# Patient Record
Sex: MALE | Race: BLACK OR AFRICAN AMERICAN | Employment: FULL TIME | URBAN - METROPOLITAN AREA
[De-identification: names, ages, dates, MRNs, and addresses within clinical notes are randomized per-mention and may not be internally consistent; named-entity substitution may affect disease eponyms.]

---

## 2022-02-04 VITALS
RESPIRATION RATE: 16 BRPM | WEIGHT: 178.57 LBS | TEMPERATURE: 98.9 F | HEART RATE: 85 BPM | HEIGHT: 77 IN | DIASTOLIC BLOOD PRESSURE: 81 MMHG | SYSTOLIC BLOOD PRESSURE: 126 MMHG | BODY MASS INDEX: 21.08 KG/M2 | OXYGEN SATURATION: 100 %

## 2022-02-04 PROCEDURE — 96372 THER/PROPH/DIAG INJ SC/IM: CPT

## 2022-02-04 PROCEDURE — 99283 EMERGENCY DEPT VISIT LOW MDM: CPT

## 2022-02-05 ENCOUNTER — HOSPITAL ENCOUNTER (EMERGENCY)
Age: 27
Discharge: HOME | End: 2022-02-05
Attending: EMERGENCY MEDICINE
Payer: COMMERCIAL

## 2022-02-05 DIAGNOSIS — M62.838 NECK MUSCLE SPASM: Primary | ICD-10-CM

## 2022-02-05 PROCEDURE — 74011250636 HC RX REV CODE- 250/636: Performed by: PHYSICIAN ASSISTANT

## 2022-02-05 PROCEDURE — 74011250637 HC RX REV CODE- 250/637: Performed by: PHYSICIAN ASSISTANT

## 2022-02-05 PROCEDURE — 74011636637 HC RX REV CODE- 636/637: Performed by: PHYSICIAN ASSISTANT

## 2022-02-05 PROCEDURE — 74011000250 HC RX REV CODE- 250: Performed by: PHYSICIAN ASSISTANT

## 2022-02-05 RX ORDER — CYCLOBENZAPRINE HCL 10 MG
10 TABLET ORAL
Qty: 10 TABLET | Refills: 0 | Status: SHIPPED | OUTPATIENT
Start: 2022-02-05

## 2022-02-05 RX ORDER — PREDNISONE 20 MG/1
20 TABLET ORAL
Status: COMPLETED | OUTPATIENT
Start: 2022-02-05 | End: 2022-02-05

## 2022-02-05 RX ORDER — CYCLOBENZAPRINE HCL 10 MG
10 TABLET ORAL
Status: COMPLETED | OUTPATIENT
Start: 2022-02-05 | End: 2022-02-05

## 2022-02-05 RX ORDER — METHYLPREDNISOLONE 4 MG/1
TABLET ORAL
Qty: 1 DOSE PACK | Refills: 0 | Status: SHIPPED | OUTPATIENT
Start: 2022-02-05

## 2022-02-05 RX ORDER — KETOROLAC TROMETHAMINE 30 MG/ML
60 INJECTION, SOLUTION INTRAMUSCULAR; INTRAVENOUS
Status: COMPLETED | OUTPATIENT
Start: 2022-02-05 | End: 2022-02-05

## 2022-02-05 RX ORDER — LIDOCAINE 4 G/100G
1 PATCH TOPICAL
Status: DISCONTINUED | OUTPATIENT
Start: 2022-02-05 | End: 2022-02-05 | Stop reason: HOSPADM

## 2022-02-05 RX ADMIN — KETOROLAC TROMETHAMINE 60 MG: 30 INJECTION, SOLUTION INTRAMUSCULAR; INTRAVENOUS at 01:18

## 2022-02-05 RX ADMIN — PREDNISONE 20 MG: 20 TABLET ORAL at 01:18

## 2022-02-05 RX ADMIN — CYCLOBENZAPRINE 10 MG: 10 TABLET, FILM COATED ORAL at 01:18

## 2022-02-05 NOTE — ED TRIAGE NOTES
Pt arrives ambulatory through triage c/o 8/10 L neck pain that has been progressively worse since he got hit by someone's arm while playing basketball on Tuesday. Pt reports sharp, throbbing pain with movement. Pt tried aleve and icy-hot without relief.

## 2022-02-05 NOTE — ED PROVIDER NOTES
21-year-old male with no significant past medical history presents amatory for evaluation of left lateral trapezius muscle pain sustained after an injury 4 days ago. He states when he was playing basketball he was hit on the top of his shoulder between his neck and shoulder joint, not at his clavicle region. He states at the time the pain was about 6/10, the next day he woke up and felt like his neck was more stiff and pain was 7/10. He states he has had decreased range of motion laterally with his neck ever since. He denies fever, chills, nausea, vomiting, diarrhea, chest pain, shortness of breath, rash. He has a dull headache. He has tried icy hot patches with little relief. He denies numbness, tingling, weakness, ataxia, pain with moving arms or legs, difficulty swallowing or breathing. History reviewed. No pertinent past medical history. History reviewed. No pertinent surgical history. History reviewed. No pertinent family history. Social History     Socioeconomic History    Marital status: SINGLE     Spouse name: Not on file    Number of children: Not on file    Years of education: Not on file    Highest education level: Not on file   Occupational History    Not on file   Tobacco Use    Smoking status: Not on file    Smokeless tobacco: Not on file   Substance and Sexual Activity    Alcohol use: Not on file    Drug use: Not on file    Sexual activity: Not on file   Other Topics Concern    Not on file   Social History Narrative    Not on file     Social Determinants of Health     Financial Resource Strain:     Difficulty of Paying Living Expenses: Not on file   Food Insecurity:     Worried About Running Out of Food in the Last Year: Not on file    Jessica of Food in the Last Year: Not on file   Transportation Needs:     Lack of Transportation (Medical): Not on file    Lack of Transportation (Non-Medical):  Not on file   Physical Activity:     Days of Exercise per Week: Not on file    Minutes of Exercise per Session: Not on file   Stress:     Feeling of Stress : Not on file   Social Connections:     Frequency of Communication with Friends and Family: Not on file    Frequency of Social Gatherings with Friends and Family: Not on file    Attends Muslim Services: Not on file    Active Member of Clubs or Organizations: Not on file    Attends Club or Organization Meetings: Not on file    Marital Status: Not on file   Intimate Partner Violence:     Fear of Current or Ex-Partner: Not on file    Emotionally Abused: Not on file    Physically Abused: Not on file    Sexually Abused: Not on file   Housing Stability:     Unable to Pay for Housing in the Last Year: Not on file    Number of [place] in the Last Year: Not on file    Unstable Housing in the Last Year: Not on file         ALLERGIES: Patient has no known allergies. Review of Systems   Constitutional: Negative. Negative for activity change, chills, fatigue and unexpected weight change. HENT: Negative for trouble swallowing. Respiratory: Negative for cough, chest tightness, shortness of breath and wheezing. Cardiovascular: Negative. Negative for chest pain and palpitations. Gastrointestinal: Negative. Negative for abdominal pain, diarrhea, nausea and vomiting. Genitourinary: Negative. Negative for dysuria, flank pain, frequency and hematuria. Musculoskeletal: Positive for neck pain. Negative for arthralgias and back pain. Skin: Negative. Negative for color change and rash. Neurological: Negative. Negative for dizziness, numbness and headaches. All other systems reviewed and are negative. Vitals:    02/04/22 2343   BP: 126/81   Pulse: 85   Resp: 16   Temp: 98.9 °F (37.2 °C)   SpO2: 100%   Weight: 81 kg (178 lb 9.2 oz)   Height: 6' 5\" (1.956 m)            Physical Exam  Vitals and nursing note reviewed. Constitutional:       General: He is not in acute distress.      Appearance: He is well-developed. He is not toxic-appearing or diaphoretic. HENT:      Head: Normocephalic and atraumatic. Eyes:      General:         Right eye: No discharge. Left eye: No discharge. Conjunctiva/sclera: Conjunctivae normal.      Pupils: Pupils are equal, round, and reactive to light. Neck:      Trachea: No tracheal tenderness. Cardiovascular:      Rate and Rhythm: Normal rate and regular rhythm. Pulses: Normal pulses. Heart sounds: Normal heart sounds. No murmur heard. No friction rub. No gallop. Pulmonary:      Effort: Pulmonary effort is normal. No respiratory distress. Breath sounds: Normal breath sounds. No wheezing or rales. Chest:      Chest wall: No tenderness. Abdominal:      General: Bowel sounds are normal. There is no distension. Palpations: Abdomen is soft. Tenderness: There is no abdominal tenderness. There is no guarding or rebound. Musculoskeletal:         General: No tenderness. Normal range of motion. Skin:     General: Skin is warm and dry. Capillary Refill: Capillary refill takes less than 2 seconds. Findings: No abrasion, erythema or rash. Neurological:      Mental Status: He is alert and oriented to person, place, and time. Cranial Nerves: No cranial nerve deficit. Sensory: No sensory deficit. Coordination: Coordination normal.   Psychiatric:         Speech: Speech normal.         Behavior: Behavior normal.          MDM  Number of Diagnoses or Management Options  Neck muscle spasm  Diagnosis management comments:   Ddx: back spasm       Amount and/or Complexity of Data Reviewed  Review and summarize past medical records: yes    Patient Progress  Patient progress: stable         Procedures    DISCHARGE NOTE:  1:34 AM  The patient has been re-evaluated and feeling much better and are stable for discharge. All available radiology and laboratory results have been reviewed with patient and/or available family. Patient and/or family verbally conveyed their understanding and agreement of the patient's signs, symptoms, diagnosis, treatment and prognosis and additionally agree to follow-up as recommended in the discharge instructions or to return to the Emergency Department should their condition change or worsen prior to their follow-up appointment. All questions have been answered and patient and/or available family express understanding. MEDICATIONS GIVEN:  Medications   lidocaine 4 % patch 1 Patch (1 Patch TransDERmal Apply Patch 2/5/22 0118)   predniSONE (DELTASONE) tablet 20 mg (20 mg Oral Given 2/5/22 0118)   cyclobenzaprine (FLEXERIL) tablet 10 mg (10 mg Oral Given 2/5/22 0118)   ketorolac (TORADOL) injection 60 mg (60 mg IntraMUSCular Given 2/5/22 0118)       IMPRESSION:  1. Neck muscle spasm        PLAN:  Follow-up Information     Follow up With Specialties Details Why Contact Sandra Sheffield 54  Schedule an appointment as soon as possible for a visit  pcp for follow-up. [phone]    [facility] EMERGENCY DEP Emergency Medicine  If symptoms worsen [address] [facility]  [phone]        Current Discharge Medication List      START taking these medications    Details   cyclobenzaprine (FLEXERIL) 10 mg tablet Take 1 Tablet by mouth three (3) times daily as needed for Muscle Spasm(s).   Qty: 10 Tablet, Refills: 0  Start date: 2/5/2022      methylPREDNISolone (Medrol, Ethan,) 4 mg tablet Take as directed with food  Qty: 1 Dose Pack, Refills: 0  Start date: 2/5/2022

## 2023-05-14 RX ORDER — METHYLPREDNISOLONE 4 MG/1
TABLET ORAL
COMMUNITY
Start: 2022-02-05

## 2023-05-14 RX ORDER — CYCLOBENZAPRINE HCL 10 MG
10 TABLET ORAL 3 TIMES DAILY PRN
COMMUNITY
Start: 2022-02-05

## 2023-11-25 ENCOUNTER — APPOINTMENT (OUTPATIENT)
Facility: HOSPITAL | Age: 28
End: 2023-11-25

## 2023-11-25 ENCOUNTER — HOSPITAL ENCOUNTER (EMERGENCY)
Facility: HOSPITAL | Age: 28
Discharge: HOME | End: 2023-11-25
Attending: STUDENT IN AN ORGANIZED HEALTH CARE EDUCATION/TRAINING PROGRAM

## 2023-11-25 VITALS
SYSTOLIC BLOOD PRESSURE: 117 MMHG | HEART RATE: 63 BPM | OXYGEN SATURATION: 100 % | DIASTOLIC BLOOD PRESSURE: 66 MMHG | TEMPERATURE: 97.8 F | RESPIRATION RATE: 18 BRPM

## 2023-11-25 DIAGNOSIS — M79.604 RIGHT LEG PAIN: Primary | ICD-10-CM

## 2023-11-25 PROCEDURE — 99284 EMERGENCY DEPT VISIT MOD MDM: CPT

## 2023-11-25 PROCEDURE — 93971 EXTREMITY STUDY: CPT

## 2023-11-25 PROCEDURE — 6370000000 HC RX 637 (ALT 250 FOR IP): Performed by: PHYSICIAN ASSISTANT

## 2023-11-25 PROCEDURE — 96372 THER/PROPH/DIAG INJ SC/IM: CPT

## 2023-11-25 PROCEDURE — 6360000002 HC RX W HCPCS: Performed by: PHYSICIAN ASSISTANT

## 2023-11-25 RX ORDER — KETOROLAC TROMETHAMINE 30 MG/ML
30 INJECTION, SOLUTION INTRAMUSCULAR; INTRAVENOUS
Status: COMPLETED | OUTPATIENT
Start: 2023-11-25 | End: 2023-11-25

## 2023-11-25 RX ORDER — IBUPROFEN 600 MG/1
600 TABLET ORAL EVERY 6 HOURS PRN
Qty: 20 TABLET | Refills: 0 | Status: SHIPPED | OUTPATIENT
Start: 2023-11-25

## 2023-11-25 RX ORDER — PREDNISONE 20 MG/1
50 TABLET ORAL
Status: COMPLETED | OUTPATIENT
Start: 2023-11-25 | End: 2023-11-25

## 2023-11-25 RX ORDER — CYCLOBENZAPRINE HCL 5 MG
5 TABLET ORAL 3 TIMES DAILY PRN
Qty: 15 TABLET | Refills: 0 | Status: SHIPPED | OUTPATIENT
Start: 2023-11-25

## 2023-11-25 RX ADMIN — KETOROLAC TROMETHAMINE 30 MG: 30 INJECTION, SOLUTION INTRAMUSCULAR; INTRAVENOUS at 12:18

## 2023-11-25 RX ADMIN — PREDNISONE 50 MG: 20 TABLET ORAL at 12:19

## 2023-11-25 ASSESSMENT — PAIN DESCRIPTION - ORIENTATION: ORIENTATION: RIGHT

## 2023-11-25 ASSESSMENT — PAIN DESCRIPTION - LOCATION
LOCATION: LEG
LOCATION: LEG

## 2023-11-25 ASSESSMENT — PAIN - FUNCTIONAL ASSESSMENT: PAIN_FUNCTIONAL_ASSESSMENT: 0-10

## 2023-11-25 ASSESSMENT — PAIN SCALES - GENERAL
PAINLEVEL_OUTOF10: 6
PAINLEVEL_OUTOF10: 8

## 2023-11-25 ASSESSMENT — PAIN DESCRIPTION - DESCRIPTORS: DESCRIPTORS: DISCOMFORT

## 2023-11-25 NOTE — ED PROVIDER NOTES
[facility] EMERGENCY DEP  EMERGENCY DEPARTMENT ENCOUNTER      Pt Name: Jing Carbone  MRN: [id number]  [address] 1995  Date of evaluation: 11/25/2023  Provider: Ramírez Arias       Chief Complaint   Patient presents with    Leg Pain         HISTORY OF PRESENT ILLNESS   (Location/Symptom, Timing/Onset, Context/Setting, Quality, Duration, Modifying Factors, Severity)  Note limiting factors. 33year old M presenting to the ED for leg pain. Notes pain started yesterday - \"it's just tight, throbbing, I did some research and it sounds like I have a blood clot. \"  RIGHT side, from the buttock into the calf. No injury. NO hx VTE. No recent immobilization. Mom gave baby ASA and ibuprofen. No back pain. No weakness. Sometimes tingles. Certain positions will worsen the pain. No bowel/bladder involvement. No saddle anesthesia. PMHx: denies  Psx: denies  Social: Rare alcohol. Occasional tobacco.  Currently unemployed. NKDA    The history is provided by the patient and a parent. Review of External Medical Records:     Nursing Notes were reviewed. REVIEW OF SYSTEMS    (2-9 systems for level 4, 10 or more for level 5)     Review of Systems   Musculoskeletal:  Positive for myalgias. All other systems reviewed and are negative. Except as noted above the remainder of the review of systems was reviewed and negative. PAST MEDICAL HISTORY   History reviewed. No pertinent past medical history. SURGICAL HISTORY     History reviewed. No pertinent surgical history.       CURRENT MEDICATIONS       Discharge Medication List as of 11/25/2023 12:37 PM        CONTINUE these medications which have NOT CHANGED    Details   methylPREDNISolone (MEDROL DOSEPACK) 4 MG tablet Take as directed with foodHistorical Med      cyclobenzaprine (FLEXERIL) 10 MG tablet Take 1 tablet by mouth 3 times daily as neededHistorical Med             ALLERGIES     Patient has no known

## 2023-11-25 NOTE — DISCHARGE INSTRUCTIONS
Return for new or worsening symptoms. Your ultrasound today was negative for a blood clot. There were good pulses in the foot, and no indications that there is compromised perfusion. You may use medications as directed. Make a follow-up appointment with Ortho. Given the fact that the pain goes all the way from the buttock to the lower leg and there has been some tingling, this could be a pinched nerve.